# Patient Record
Sex: FEMALE
[De-identification: names, ages, dates, MRNs, and addresses within clinical notes are randomized per-mention and may not be internally consistent; named-entity substitution may affect disease eponyms.]

---

## 2018-02-04 ENCOUNTER — HOSPITAL ENCOUNTER (EMERGENCY)
Dept: HOSPITAL 31 - C.ER | Age: 53
Discharge: HOME | End: 2018-02-04
Payer: SELF-PAY

## 2018-02-04 VITALS — HEART RATE: 71 BPM | DIASTOLIC BLOOD PRESSURE: 81 MMHG | SYSTOLIC BLOOD PRESSURE: 130 MMHG | OXYGEN SATURATION: 100 %

## 2018-02-04 VITALS — RESPIRATION RATE: 18 BRPM | TEMPERATURE: 98.2 F

## 2018-02-04 VITALS — BODY MASS INDEX: 27.3 KG/M2

## 2018-02-04 DIAGNOSIS — R10.33: Primary | ICD-10-CM

## 2018-02-04 LAB
ALBUMIN SERPL-MCNC: 4.2 G/DL (ref 3.5–5)
ALBUMIN/GLOB SERPL: 1.3 {RATIO} (ref 1–2.1)
ALT SERPL-CCNC: 274 U/L (ref 9–52)
AST SERPL-CCNC: 320 U/L (ref 14–36)
BASOPHILS # BLD AUTO: 0.1 K/UL (ref 0–0.2)
BASOPHILS NFR BLD: 0.7 % (ref 0–2)
BILIRUB UR-MCNC: NEGATIVE MG/DL
BUN SERPL-MCNC: 13 MG/DL (ref 7–17)
CALCIUM SERPL-MCNC: 9.4 MG/DL (ref 8.6–10.4)
EOSINOPHIL # BLD AUTO: 0.2 K/UL (ref 0–0.7)
EOSINOPHIL NFR BLD: 2.2 % (ref 0–4)
ERYTHROCYTE [DISTWIDTH] IN BLOOD BY AUTOMATED COUNT: 12.9 % (ref 11.5–14.5)
GFR NON-AFRICAN AMERICAN: > 60
GLUCOSE UR STRIP-MCNC: NORMAL MG/DL
HGB BLD-MCNC: 13.5 G/DL (ref 11–16)
LEUKOCYTE ESTERASE UR-ACNC: (no result) LEU/UL
LIPASE: 205 U/L (ref 23–300)
LYMPHOCYTES # BLD AUTO: 1.2 K/UL (ref 1–4.3)
LYMPHOCYTES NFR BLD AUTO: 13.8 % (ref 20–40)
MCH RBC QN AUTO: 30.9 PG (ref 27–31)
MCHC RBC AUTO-ENTMCNC: 34.3 G/DL (ref 33–37)
MCV RBC AUTO: 89.8 FL (ref 81–99)
MONOCYTES # BLD: 0.5 K/UL (ref 0–0.8)
MONOCYTES NFR BLD: 6.1 % (ref 0–10)
NEUTROPHILS # BLD: 6.9 K/UL (ref 1.8–7)
NEUTROPHILS NFR BLD AUTO: 77.2 % (ref 50–75)
NRBC BLD AUTO-RTO: 0 % (ref 0–2)
PH UR STRIP: 6 [PH] (ref 5–8)
PLATELET # BLD: 222 K/UL (ref 130–400)
PMV BLD AUTO: 9 FL (ref 7.2–11.7)
PROT UR STRIP-MCNC: NEGATIVE MG/DL
RBC # BLD AUTO: 4.39 MIL/UL (ref 3.8–5.2)
RBC # UR STRIP: NEGATIVE /UL
SP GR UR STRIP: 1 (ref 1–1.03)
SQUAMOUS EPITHIAL: 1 /HPF (ref 0–5)
URINE NITRATE: NEGATIVE
UROBILINOGEN UR-MCNC: NORMAL MG/DL (ref 0.2–1)
WBC # BLD AUTO: 8.9 K/UL (ref 4.8–10.8)

## 2018-02-04 PROCEDURE — 85025 COMPLETE CBC W/AUTO DIFF WBC: CPT

## 2018-02-04 PROCEDURE — 96375 TX/PRO/DX INJ NEW DRUG ADDON: CPT

## 2018-02-04 PROCEDURE — 74022 RADEX COMPL AQT ABD SERIES: CPT

## 2018-02-04 PROCEDURE — 83690 ASSAY OF LIPASE: CPT

## 2018-02-04 PROCEDURE — 76705 ECHO EXAM OF ABDOMEN: CPT

## 2018-02-04 PROCEDURE — 81001 URINALYSIS AUTO W/SCOPE: CPT

## 2018-02-04 PROCEDURE — 96374 THER/PROPH/DIAG INJ IV PUSH: CPT

## 2018-02-04 PROCEDURE — 99284 EMERGENCY DEPT VISIT MOD MDM: CPT

## 2018-02-04 PROCEDURE — 80053 COMPREHEN METABOLIC PANEL: CPT

## 2018-02-04 PROCEDURE — 96361 HYDRATE IV INFUSION ADD-ON: CPT

## 2018-02-04 NOTE — C.PDOC
History Of Present Illness


52 year old female presents to the ED c/o epigastric/colic pain for the past 3 

days. Patient reports she has a history of cholecystectomy. Patient denies fever

, chills, nausea, vomit, diarrhea.


Time Seen by Provider: 02/04/18 12:50


Chief Complaint (Nursing): Abdominal Pain


History Per: Patient


History/Exam Limitations: no limitations


Onset/Duration Of Symptoms: Days


Current Symptoms Are (Timing): Still Present


Location Of Pain/Discomfort: Epigastric


Radiation Of Pain To:: None


Quality Of Discomfort: Other (Colic)


Exacerbating Factors: None


Alleviating Factors: None


Additional History Per: Patient


Abnormal Vaginal Bleeding: No





Past Medical History


Reviewed: Historical Data, Nursing Documentation, Vital Signs


Vital Signs: 


 Last Vital Signs











Temp  98.2 F   02/04/18 12:19


 


Pulse  60   02/04/18 14:49


 


Resp  18   02/04/18 14:49


 


BP  120/77   02/04/18 14:49


 


Pulse Ox  98   02/04/18 17:05














- Medical History


PMH: No Chronic Diseases


Surgical History: No Surg Hx


Family History: States: Unknown Family Hx





- Social History


Hx Alcohol Use: No


Hx Substance Use: No





- Immunization History


Hx Tetanus Toxoid Vaccination: No


Hx Influenza Vaccination: No


Hx Pneumococcal Vaccination: No





Review Of Systems


Constitutional: Negative for: Fever, Chills


Cardiovascular: Negative for: Chest Pain, Palpitations


Respiratory: Negative for: Cough, Shortness of Breath


Gastrointestinal: Positive for: Abdominal Pain.  Negative for: Nausea, Vomiting


Skin: Negative for: Rash





Physical Exam





- Physical Exam


Appears: Non-toxic, No Acute Distress


Skin: Normal Color, Warm, Dry


Head: Atraumatic, Normacephalic


Eye(s): bilateral: Normal Inspection


Nose: No Discharge, No Deformity


Oral Mucosa: Moist


Neck: Normal ROM, Supple


Chest: Symmetrical


Cardiovascular: Rhythm Regular, No Murmur


Respiratory: Normal Breath Sounds, No Rales, No Rhonchi, No Wheezing


Gastrointestinal/Abdominal: Soft, No Tenderness, No Guarding, No Rebound, Other 

(Negative McBurney's, negative [place])


Extremity: Normal ROM, No Deformity, No Swelling


Neurological/Psych: Oriented x3, Normal Speech, Normal Cognition


Gait: Steady





ED Course And Treatment





- Laboratory Results


Result Diagrams: 


 02/04/18 13:04





 02/04/18 13:04


Lab Interpretation: Abnormal (mild elev bili 1.4, AST//274, alk phos 274 

wnl)


O2 Sat by Pulse Oximetry: 98 (ON RA)


Pulse Ox Interpretation: Normal





- Other Rad


  ** Obstructive series X-Ray


X-Ray: Viewed By Me, Read By Radiologist


Interpretation: PROCEDURE:  Radiographs of the chest and abdomen (obstructive 

series).  HISTORY:  abd pain.  COMPARISON:  No prior.  TECHNIQUE:  AP 

radiograph of the chest, with upright and supine radiographs of the abdomen.  

FINDINGS:  CHEST:  Lungs: Mild bibasilar atelectasis.  There are scattered 

small calcified granulomata seen throughout both lung fields consistent with 

prior exposure to a granulomatous disease process. .  Cardiovascular: Heart 

size is borderline enlarged. . No pulmonary vascular congestion.  Pleura: No 

pleural fluid. No pneumothorax.  Other findings: None.  ABDOMEN AND PELVIS:  

Bowel: No evidence of acute mechanical bowel obstruction.  Moderate amount of 

stool seen throughout the large bowel consistent with mild fecal retention/

constipation. .  Free air: None.  Bones:  Bilateral laminectomy and posterior 

fixation changes L5-S1 level. Suspect discectomy changes as well at this level. 

.  Other findings: Metallic clips right upper quadrant of the abdomen 

consistent prior cholecystectomy. .  IMPRESSION:  Mild bibasilar atelectasis.  

Scattered calcified granulomata consistent with a prior exposure to a 

granulomatous disease process.  No evidence of acute mechanical bowel 

obstruction Findings suggest mild constipation. No evidence of free 

intraperitoneal air.  Status post cholecystectomy.  Postoperative laminectomy 

probable discectomy and posterior fusion L5-S1 level as above.





- CT Scan/US


  ** Abdomen US


Other Rad Studies (CT/US): Read By Radiologist, Radiology Report Reviewed


CT/US Interpretation: HISTORY:  epigastric colic, h/o [place]-  elev LFT's.  

COMPARISON:  None.  TECHNIQUE:  Sonographic evaluation of the right upper 

quadrant of the abdomen.  FINDINGS:  LIVER:  Liver measures approximately 15.2 

cm in in CC dimension. Liver demonstrates smooth contour and increased 

echotexture suggesting fatty infiltration however other infiltrative 

hepatocellular disease process not excluded. No mass. No intrahepatic bile duct 

dilatation. .  No ascites.  Portal vein demonstrates hepatopetal flow.  

GALLBLADDER:  Status post cholecystectomy.  No sonographic Cedeño sign.  COMMON 

BILE DUCT:  Common bile duct is prominent measuring approximately 6.8 mm.  No 

stones. No dilatation.  PANCREAS:  Pancreas is not well delineated due to body 

habitus and bowel gas.  RIGHT KIDNEY:  Right kidney measures approximately 9.9 

x 4.7 x 4.5 cm in length. Normal echogenicity. No calculus, mass, or 

hydronephrosis. .  There is a small mid to lower pole cyst that measures 

approximately 1 cm in greatest dimension.  AORTA:  No aneurysmal dilatation.  

IVC:  Unremarkable.  OTHER FINDINGS:  None .  IMPRESSION:  Status post 

cholecystectomy.  Slightly dilated common bile duct likely due to post 

cholecystectomy state.  No evidence of choledocholithiasis so far as can be 

seen.  Small mid-lower pole cyst as above. No evidence of shadowing calculi or 

hydronephrosis


Progress Note: ns, toradol


Reevaluation Time: 17:30


Reassessment Condition: Improved





Medical Decision Making


Medical Decision Making: 


Impression : abdominal pain 


Plan:


* Labs


* Obstructive series X-Ray


* UA


* Pepcid 20 mg IVP


* IV fluids


* Toradol 30 mg IVP








mild elev bili/lft's and dilated CBD c/w h/o cholecycstectomy and NOT acute 

choledocolythiasis nor biliary colic.





periumbilical colic, more c/w constipation (FOS)





Disposition


Doctor Will See Patient In The: Office


Counseled Patient/Family Regarding: Studies Performed, Diagnosis





- Disposition


Disposition: HOME/ ROUTINE


Disposition Time: 17:42


Condition: GOOD


Forms:  [facility] (English)





- Clinical Impression


Clinical Impression: 


 Colicky periumbilical abdominal pain








- Scribe Statement


The provider has reviewed the documentation as recorded by the Scribe





[place]





All medical record entries made by the Scribe were at my direction and 

personally dictated by me. I have reviewed the chart and agree that the record 

accurately reflects my personal performance of the history, physical exam, 

medical decision making, and the department course for this patient. I have 

also personally directed, reviewed, and agree with the discharge instructions 

and disposition.

## 2018-02-04 NOTE — RAD
PROCEDURE:  Radiographs of the chest and abdomen (obstructive series)



HISTORY:

abd pain  



COMPARISON:

No prior.



TECHNIQUE:

AP radiograph of the chest, with upright and supine radiographs of 

the abdomen.



FINDINGS:



CHEST:

Lungs: Mild bibasilar atelectasis.  There are scattered small 

calcified granulomata seen throughout both lung fields consistent 

with prior exposure to a granulomatous disease process. .



Cardiovascular: Heart size is borderline enlarged. . No pulmonary 

vascular congestion.



Pleura: No pleural fluid. No pneumothorax.



Other findings: None.



ABDOMEN AND PELVIS:

Bowel: No evidence of acute mechanical bowel obstruction.  Moderate 

amount of stool seen throughout the large bowel consistent with mild 

fecal retention/constipation. .



Free air: None.



Bones:  Bilateral laminectomy and posterior fixation changes L5-S1 

level. Suspect discectomy changes as well at this level. .



Other findings: Metallic clips right upper quadrant of the abdomen 

consistent prior cholecystectomy. .



IMPRESSION:

Mild bibasilar atelectasis.  Scattered calcified granulomata 

consistent with a prior exposure to a granulomatous disease process. 



No evidence of acute mechanical bowel obstruction Findings suggest 

mild constipation. No evidence of free intraperitoneal air. 



Status post cholecystectomy. 



Postoperative laminectomy probable discectomy and posterior fusion 

L5-S1 level as above.

## 2018-02-04 NOTE — US
HISTORY:

epigastric colic, h/o [place]-  elev LFT's



COMPARISON:

None.



TECHNIQUE:

Sonographic evaluation of the right upper quadrant of the abdomen.



FINDINGS:



LIVER:

Liver measures approximately 15.2 cm in in CC dimension. Liver 

demonstrates smooth contour and increased echotexture suggesting 

fatty infiltration however other infiltrative hepatocellular disease 

process not excluded. No mass. No intrahepatic bile duct dilatation. 

.  No ascites.  Portal vein demonstrates hepatopetal flow. 



GALLBLADDER:

Status post cholecystectomy.  No sonographic Cedeño sign. 



COMMON BILE DUCT:

Common bile duct is prominent measuring approximately 6.8 mm.  No 

stones. No dilatation.



PANCREAS:

Pancreas is not well delineated due to body habitus and bowel gas. 



RIGHT KIDNEY:

Right kidney measures approximately 9.9 x 4.7 x 4.5 cm in length. 

Normal echogenicity. No calculus, mass, or hydronephrosis. .  There 

is a small mid to lower pole cyst that measures approximately 1 cm in 

greatest dimension. 



AORTA:

No aneurysmal dilatation.



IVC:

Unremarkable.



OTHER FINDINGS:

None .



IMPRESSION:

Status post cholecystectomy.  Slightly dilated common bile duct 

likely due to post cholecystectomy state.  No evidence of 

choledocholithiasis so far as can be seen. 



Small mid-lower pole cyst as above. No evidence of shadowing calculi 

or hydronephrosis